# Patient Record
Sex: FEMALE | Race: WHITE | NOT HISPANIC OR LATINO
[De-identification: names, ages, dates, MRNs, and addresses within clinical notes are randomized per-mention and may not be internally consistent; named-entity substitution may affect disease eponyms.]

---

## 2020-07-02 PROBLEM — Z00.00 ENCOUNTER FOR PREVENTIVE HEALTH EXAMINATION: Status: ACTIVE | Noted: 2020-07-02

## 2020-07-05 DIAGNOSIS — M79.672 PAIN IN LEFT FOOT: ICD-10-CM

## 2020-07-06 ENCOUNTER — APPOINTMENT (OUTPATIENT)
Dept: ORTHOPEDIC SURGERY | Facility: CLINIC | Age: 80
End: 2020-07-06
Payer: MEDICARE

## 2020-07-06 VITALS — WEIGHT: 200 LBS | RESPIRATION RATE: 16 BRPM | BODY MASS INDEX: 32.14 KG/M2 | HEIGHT: 66 IN

## 2020-07-06 DIAGNOSIS — Z78.9 OTHER SPECIFIED HEALTH STATUS: ICD-10-CM

## 2020-07-06 PROCEDURE — 73630 X-RAY EXAM OF FOOT: CPT | Mod: RT

## 2020-07-06 PROCEDURE — 73610 X-RAY EXAM OF ANKLE: CPT | Mod: RT

## 2020-07-06 PROCEDURE — 99204 OFFICE O/P NEW MOD 45 MIN: CPT

## 2020-07-06 RX ORDER — CHLORHEXIDINE GLUCONATE, 0.12% ORAL RINSE 1.2 MG/ML
0.12 SOLUTION DENTAL
Qty: 473 | Refills: 0 | Status: ACTIVE | COMMUNITY
Start: 2020-01-30

## 2020-07-06 RX ORDER — VALACYCLOVIR 500 MG/1
500 TABLET, FILM COATED ORAL
Qty: 270 | Refills: 0 | Status: ACTIVE | COMMUNITY
Start: 2019-10-14

## 2020-07-06 RX ORDER — METFORMIN ER 500 MG 500 MG/1
500 TABLET ORAL
Qty: 180 | Refills: 0 | Status: ACTIVE | COMMUNITY
Start: 2019-10-21

## 2020-07-06 RX ORDER — TOLTERODINE TARTRATE 4 MG/1
4 CAPSULE, EXTENDED RELEASE ORAL
Qty: 90 | Refills: 0 | Status: ACTIVE | COMMUNITY
Start: 2019-10-14

## 2020-07-15 ENCOUNTER — APPOINTMENT (OUTPATIENT)
Dept: ORTHOPEDIC SURGERY | Facility: CLINIC | Age: 80
End: 2020-07-15
Payer: MEDICARE

## 2020-07-15 PROCEDURE — 99442: CPT | Mod: 95

## 2020-08-14 ENCOUNTER — APPOINTMENT (OUTPATIENT)
Dept: ORTHOPEDIC SURGERY | Facility: CLINIC | Age: 80
End: 2020-08-14
Payer: MEDICARE

## 2020-08-14 VITALS — BODY MASS INDEX: 32.14 KG/M2 | HEIGHT: 66 IN | WEIGHT: 200 LBS | RESPIRATION RATE: 16 BRPM

## 2020-08-14 DIAGNOSIS — M79.671 PAIN IN RIGHT FOOT: ICD-10-CM

## 2020-08-14 PROCEDURE — 73630 X-RAY EXAM OF FOOT: CPT | Mod: RT

## 2020-08-14 PROCEDURE — 99213 OFFICE O/P EST LOW 20 MIN: CPT

## 2020-10-05 ENCOUNTER — APPOINTMENT (OUTPATIENT)
Dept: ORTHOPEDIC SURGERY | Facility: CLINIC | Age: 80
End: 2020-10-05
Payer: MEDICARE

## 2020-10-05 VITALS — BODY MASS INDEX: 32.14 KG/M2 | WEIGHT: 200 LBS | RESPIRATION RATE: 16 BRPM | HEIGHT: 66 IN

## 2020-10-05 DIAGNOSIS — M84.374A STRESS FRACTURE, RIGHT FOOT, INITIAL ENCOUNTER FOR FRACTURE: ICD-10-CM

## 2020-10-05 DIAGNOSIS — M21.6X1 OTHER ACQUIRED DEFORMITIES OF RIGHT FOOT: ICD-10-CM

## 2020-10-05 DIAGNOSIS — M19.071 PRIMARY OSTEOARTHRITIS, RIGHT ANKLE AND FOOT: ICD-10-CM

## 2020-10-05 PROCEDURE — 99214 OFFICE O/P EST MOD 30 MIN: CPT

## 2020-10-05 PROCEDURE — 73630 X-RAY EXAM OF FOOT: CPT | Mod: RT

## 2020-10-05 RX ORDER — DICLOFENAC SODIUM 10 MG/G
1 GEL TOPICAL DAILY
Qty: 1 | Refills: 3 | Status: ACTIVE | COMMUNITY
Start: 2020-10-05 | End: 1900-01-01

## 2020-10-19 ENCOUNTER — TRANSCRIPTION ENCOUNTER (OUTPATIENT)
Age: 80
End: 2020-10-19

## 2022-07-13 ENCOUNTER — RX ONLY (RX ONLY)
Age: 82
End: 2022-07-13

## 2022-07-13 ENCOUNTER — OFFICE (OUTPATIENT)
Dept: URBAN - METROPOLITAN AREA CLINIC 30 | Facility: CLINIC | Age: 82
Setting detail: OPHTHALMOLOGY
End: 2022-07-13
Payer: MEDICARE

## 2022-07-13 DIAGNOSIS — H35.3121: ICD-10-CM

## 2022-07-13 DIAGNOSIS — H43.813: ICD-10-CM

## 2022-07-13 DIAGNOSIS — H26.493: ICD-10-CM

## 2022-07-13 DIAGNOSIS — H40.003: ICD-10-CM

## 2022-07-13 DIAGNOSIS — H35.3211: ICD-10-CM

## 2022-07-13 PROCEDURE — 92133 CPTRZD OPH DX IMG PST SGM ON: CPT | Performed by: OPHTHALMOLOGY

## 2022-07-13 PROCEDURE — 99204 OFFICE O/P NEW MOD 45 MIN: CPT | Performed by: OPHTHALMOLOGY

## 2022-07-13 PROCEDURE — 76514 ECHO EXAM OF EYE THICKNESS: CPT | Performed by: OPHTHALMOLOGY

## 2022-07-13 PROCEDURE — 92020 GONIOSCOPY: CPT | Performed by: OPHTHALMOLOGY

## 2022-07-13 ASSESSMENT — PACHYMETRY
OS_CT_UM: 547
OS_CT_CORRECTION: 0
OD_CT_CORRECTION: -1
OD_CT_UM: 566

## 2022-07-13 ASSESSMENT — CONFRONTATIONAL VISUAL FIELD TEST (CVF)
OS_FINDINGS: FULL
OD_FINDINGS: FULL

## 2022-07-13 ASSESSMENT — TONOMETRY
OS_IOP_MMHG: 19
OD_IOP_MMHG: 18

## 2022-07-15 ASSESSMENT — VISUAL ACUITY
OD_BCVA: 20/30+2
OS_BCVA: 20/25-2

## 2022-07-15 ASSESSMENT — REFRACTION_AUTOREFRACTION
OS_SPHERE: -0.75
OS_AXIS: 15
OD_SPHERE: -0.50
OS_CYLINDER: +0.25
OD_CYLINDER: +0.75
OD_AXIS: 120

## 2022-07-15 ASSESSMENT — REFRACTION_CURRENTRX
OD_OVR_VA: 20/
OS_SPHERE: +1.75
OD_SPHERE: +1.75
OS_OVR_VA: 20/

## 2022-07-15 ASSESSMENT — SPHEQUIV_DERIVED
OS_SPHEQUIV: -0.625
OD_SPHEQUIV: -0.125

## 2022-08-11 ENCOUNTER — OFFICE (OUTPATIENT)
Dept: URBAN - METROPOLITAN AREA CLINIC 29 | Facility: CLINIC | Age: 82
Setting detail: OPHTHALMOLOGY
End: 2022-08-11
Payer: MEDICARE

## 2022-08-11 DIAGNOSIS — H40.002: ICD-10-CM

## 2022-08-11 DIAGNOSIS — H40.003: ICD-10-CM

## 2022-08-11 DIAGNOSIS — H26.493: ICD-10-CM

## 2022-08-11 DIAGNOSIS — H35.3121: ICD-10-CM

## 2022-08-11 DIAGNOSIS — H35.3211: ICD-10-CM

## 2022-08-11 PROCEDURE — 99213 OFFICE O/P EST LOW 20 MIN: CPT | Performed by: OPHTHALMOLOGY

## 2022-08-11 PROCEDURE — 65855 TRABECULOPLASTY LASER SURG: CPT | Performed by: OPHTHALMOLOGY

## 2022-08-11 ASSESSMENT — REFRACTION_AUTOREFRACTION
OS_SPHERE: -0.75
OD_AXIS: 120
OS_AXIS: 15
OS_CYLINDER: +0.25
OD_CYLINDER: +0.75
OD_SPHERE: -0.50

## 2022-08-11 ASSESSMENT — PACHYMETRY
OD_CT_UM: 566
OD_CT_CORRECTION: -1

## 2022-08-11 ASSESSMENT — REFRACTION_CURRENTRX
OD_OVR_VA: 20/
OS_OVR_VA: 20/
OS_SPHERE: +1.75
OD_SPHERE: +1.75

## 2022-08-11 ASSESSMENT — SPHEQUIV_DERIVED
OS_SPHEQUIV: -0.625
OD_SPHEQUIV: -0.125

## 2022-08-11 ASSESSMENT — TONOMETRY
OS_IOP_MMHG: 20
OD_IOP_MMHG: 17

## 2022-08-11 ASSESSMENT — CONFRONTATIONAL VISUAL FIELD TEST (CVF)
OS_FINDINGS: FULL
OD_FINDINGS: FULL

## 2022-08-11 ASSESSMENT — VISUAL ACUITY
OD_BCVA: 20/50
OS_BCVA: 20/20

## 2022-08-27 DIAGNOSIS — M25.561 PAIN IN RIGHT KNEE: ICD-10-CM

## 2022-08-29 ENCOUNTER — APPOINTMENT (OUTPATIENT)
Dept: ORTHOPEDIC SURGERY | Facility: CLINIC | Age: 82
End: 2022-08-29

## 2022-08-29 VITALS — BODY MASS INDEX: 35.36 KG/M2 | HEIGHT: 66 IN | WEIGHT: 220 LBS

## 2022-08-29 PROCEDURE — 20610 DRAIN/INJ JOINT/BURSA W/O US: CPT | Mod: RT

## 2022-08-29 PROCEDURE — 99215 OFFICE O/P EST HI 40 MIN: CPT | Mod: 25

## 2022-08-29 PROCEDURE — 73564 X-RAY EXAM KNEE 4 OR MORE: CPT | Mod: RT

## 2022-08-29 NOTE — DISCUSSION/SUMMARY
[de-identified] : Discussed at length the nature of the patient's condition. Their right knee symptoms appear secondary to degenerative arthritis. We reviewed operative and non-operative treatment. While I believe she would eventually benefit from a TKR, she is hesitant to have surgery at this time. We will seek authorization for right knee Euflexxa injections. Once we receive authorization, we will proceed accordingly. In the interim, patient was given a right knee cortisone injection today as detailed above for symptomatic relief. I also suggested PT, weight loss, and Tylenol, Advil or Aleve prn. They can continue activities as tolerated. All was explained in the presence of their .

## 2022-08-29 NOTE — PHYSICAL EXAM
[de-identified] : General appearance: well nourished and hydrated, pleasant, alert and oriented x 3, cooperative.\par HEENT: Normocephalic, EOM intact, Nasal septum midline, Oral cavity clear, External auditory canal clear.\par Cardiovascular: no apparent abnormalities, no lower leg edema, no varicosities, pedal pulses are palpable.\par Lymphatics Lymph nodes: none palpated, Lymphedema: not present.\par Neurologic: sensation is normal, no muscle weakness in upper or lower extremities, patella tendon reflexes intact .\par Dermatologic no apparent skin lesions, moist, warm, no rash.\par Spine:cervical spine appears normal and moves freely, thoracic spine appears normal and moves freely, lumbosacral spine appears normal and moves freely.\par Gait: nonantalgic.\par \par Left knee\par Inspection: no effusion or erythema.\par Wounds: none.\par Alignment: normal.\par Palpation: no specific tenderness on palpation.\par ROM active (in degrees): 0-110 with mild crepitus.\par Ligamentous laxity: all ligaments appear stable,, negative ant. drawer test, negative post. drawer test, stable to varus stress test, stable to valgus stress test. negative Lachman's test, negative pivot shift test\par Meniscal Test: negative McMurrays, negative Staci.\par Patellofemoral Alignment Test: Q angle-, normal.\par Muscle Test: good quad strength.\par \par Right knee\par Inspection: no effusion or erythema.\par Wounds: none.\par Alignment: normal.\par Palpation: medial and lateral tenderness on palpation.\par ROM active (in degrees): 0-120 with pain on extremes of flexion & extension.\par Ligamentous laxity: all ligaments appear stable,, negative ant. drawer test, negative post. drawer test, stable to varus stress test, stable to valgus stress test. negative Lachman's test, negative pivot shift test\par Meniscal Test: negative McMurrays, negative Staci.\par Patellofemoral Alignment Test: Q angle-, normal.\par Muscle Test: good quad strength.\par \par Left hip\par Inspection: No swelling or ecchymosis.\par Wounds: none.\par Palpation: non-tender.\par Stability: no instability.\par Strength: 5/5 all motor groups.\par ROM: no pain with FROM.\par Leg length: equal.\par \par Right hip\par Inspection: No swelling or ecchymosis.\par Wounds: none.\par Palpation: non-tender.\par Stability: no instability.\par Strength: 5/5 all motor groups.\par ROM: no pain with FROM.\par Leg length: equal.\par \par Left ankle\par Inspection: no erythema noted, no swelling noted.\par Palpation: no pain on palpation .\par ROM: FROM without crepitus.\par Muscle strength: 5/5.\par Stability: no instability noted.\par \par Right ankle\par Inspection: no erythema noted, no swelling noted.\par ROM: FROM without crepitus.\par Palpation: no pain on palpation .\par Muscle strength: 5/5.\par Stability: no instability noted.\par \par Left foot\par Inspection: color, texture and turgor are normal.\par ROM: full range of motion of all joints without pain or crepitus.\par Palpation: no tenderness.\par Stability: no instability noted.\par \par Right foot\par Inspection: color, texture and turgor are normal.\par ROM: full range of motion of all joints without pain or crepitus.\par Palpation: no tenderness.\par Stability: no instability noted.\par \par Left shoulder\par Inspection: no muscle asymmetry, no atrophy.\par Palpation: no tenderness noted, ACJ non-tender.\par ROM: full active ROM, full passive ROM.\par Strength testing): anterior deltoid, supraspinatus, infraspinatus, subscapularis all 5/5.\par Stability test: ant. apprehension negative, post. apprehension negative, relocation test negative.\par Impingement Test: negative NEER.\par \par Right shoulder\par Inspection: no muscle asymmetry, no atrophy.\par Palpation: no tenderness noted, ACJ non-tender.\par ROM: full active ROM, full passive ROM.\par Strength testing): anterior deltoid, supraspinatus, infraspinatus, subscapularis all 5/5.\par Stability test: ant. apprehension negative, post. apprehension negative, relocation test negative.\par Impingement Test: negative NEER.\par Surgical Wounds: none.\par \par Left elbow\par Inspection: negative swelling.\par Wounds: none.\par Palpation: non-tender.\par ROM: full ROM.\par Strength: 5/5 all groups.\par Stability: no instability.\par Mass: none.\par \par Right elbow\par Inspection: negative swelling.\par Wounds: none.\par Palpation: non-tender.\par ROM: full ROM.\par Strength: 5/5 all groups.\par Stability: no instability.\par Mass: none.\par \par Left wrist\par Inspection: negative swelling.\par Wound: none.\par Palpation (bone): no tenderness.\par ROM: full ROM.\par Strength: full , good.\par \par Right wrist\par Inspection: negative swelling.\par Wound: none.\par Palpation (bone): no tenderness.\par ROM: full ROM.\par Strength: full , good.\par \par Left hand \par Inspection: no skin changes, normal appearance.\par Wounds: none. \par Strength: full , able to make full fist.\par Sensation: light touch intact all fingers and thumb.\par Vascular: good capillary refill < 3 seconds, all fingers and thumb.\par Mass: none. \par \par Right hand\par Inspection: no skin changes, normal appearance. \par Wounds: none.\par Strength: full , able to make full fist. \par Sensation: light touch intact all fingers and thumb. \par Vascular: good capillary refill < 3 seconds, all fingers and thumb.\par Mass: none.  [de-identified] : RIGHT knee x-rays, standing AP/Lateral and Merchant films, and 45 degree PA standing view, taken at the office today shows diffuse tricompartmental degenerative arthritis, lateral joint space narrowing, bone on bone, Sclerosis, especially on the Tilley view, medial and lateral osteophyte formation, patella at an appropriate height and central position, Kellgren and Hugh grade 3-4. \par \par LEFT knee x-ray merchant view taken at the office today demonstrates joint space narrowing, marginal osteophytes, sclerosis consistent with patellofemoral arthritis

## 2022-08-29 NOTE — HISTORY OF PRESENT ILLNESS
[de-identified] : 82 year old female presents for initial evaluation of right knee pain for about a year. She saw Dr. Tim Chauhan in [place], who gave her hyalgan injections 4 years ago, which did help. She had another series of gel shots 2 years ago with Dr. Patel, which also helped. She was doing well until recently, when she fell twice. First, 2 months ago in [place], she saw Dr. Chauhan who gave her a walker. Her second fall was 3 weeks ago, she went to urgent care where her x-rays where found to be negative and she was advised to rest. Her pain is diffuse, sharp with swelling, clicking, and stiffness. She has difficulty getting up from a seated position and has pain after sitting for prolonged periods of time. When walking, she has pain at first, but it gets better as she walks for a while. Patient takes the stairs one at a time using the handrail. It is easier to go up stairs than down. Her  got her a cane but she didn't really use it. She takes 2 Tylenol and 2 Advil at once, which helps. She has allergies to penicillin and opiates. No significant medical history. Her  is a GI doctor.

## 2022-08-29 NOTE — PROCEDURE
[de-identified] : RIGHT KNEE CORTISONE INJECTION\par Discussed at length with the patient the planned steroid and lidocaine injection. The risks, benefits, convalescence and alternatives were reviewed. The possible side effects discussed included but were not limited to: pain, swelling, heat and redness. These symptoms are generally mild but if they are extensive then contact the office. Giving pain relievers by mouth such as NSAID’s or Tylenol can generally treat the reactions to steroid and lidocaine. Rare cases of infection have been noted. Rash, hives and itching may occur post injection. If you have muscle pain or cramps, flushing and or swelling of the face, rapid heart beat, nausea, dizziness, fever, chills, headache, difficulty breathing, swelling in the arms or legs, or have a prickly feeling of your skin, contact a health care provider immediately.\par \par Following this discussion, the knee was prepped with betadine and under sterile conditions 5 cc of 2% lidocaine and 1 cc depo-medrol (40mg) were injected with a 21 gauge needle. The needle was introduced into the joint, aspiration was performed to ensure intra-articular placement and the medication was injected. Upon withdrawal of the needle the site was cleaned with alcohol and a bandaid applied. The patient tolerated the injection well and there were no adverse effects. Post injection instructions included no strenuous activity for 24 hours, cryotherapy and if there are any adverse effects to contact the office.

## 2022-09-09 VITALS — WEIGHT: 220 LBS | BODY MASS INDEX: 35.36 KG/M2 | HEIGHT: 66 IN

## 2022-09-12 ENCOUNTER — APPOINTMENT (OUTPATIENT)
Dept: ORTHOPEDIC SURGERY | Facility: CLINIC | Age: 82
End: 2022-09-12

## 2022-09-12 PROCEDURE — 20610 DRAIN/INJ JOINT/BURSA W/O US: CPT | Mod: RT

## 2022-09-12 NOTE — PROCEDURE
[de-identified] : Euflexxa # 1 Right Knee\par Discussed at length with the patient the planned Euflexxa injection. The risks, benefits, convalescence and alternatives were reviewed. The possible side effects discussed included but were not limited to: pain, swelling, heat and redness. There symptoms are generally mild but if they are extensive then contact the office. Giving pain relievers by mouth such as NSAID’s or Tylenol can generally treat the reactions to Euflexxa. Rare cases of infection have been noted. Rash, hives and itching may occur post injection. If you have muscle pain or cramps, flushing and or swelling of the face, rapid heart beat, nausea, dizziness, fever, chills, headache, difficulty breathing, swelling in the arms or legs, or have a prickly feeling of your skin, contact a health care provider immediately.\par \par Following this discussion, the knee was prepped with betadine and under sterile condition the Euflexxa injection was performed with a 22 gauge needle. The needle was introduced into the joint, aspiration was performed to ensure intra-articular placement and the medication was injected. Upon withdrawal of the needle the site was cleaned with alcohol and a bandaid applied. The patient tolerated the injection well and there were no adverse effects. Post injection instructions included no strenuous activity for 24 hours, cryotherapy and if there are any adverse effects to contact the office.\par

## 2022-09-18 VITALS — WEIGHT: 220 LBS | BODY MASS INDEX: 35.36 KG/M2 | HEIGHT: 66 IN

## 2022-09-19 ENCOUNTER — APPOINTMENT (OUTPATIENT)
Dept: ORTHOPEDIC SURGERY | Facility: CLINIC | Age: 82
End: 2022-09-19

## 2022-09-19 PROCEDURE — 20610 DRAIN/INJ JOINT/BURSA W/O US: CPT | Mod: RT

## 2022-09-19 NOTE — PROCEDURE
[de-identified] : Euflexxa # 2 Right knee\par Discussed at length with the patient the planned Euflexxa injection. The risks, benefits, convalescence and alternatives were reviewed. The possible side effects discussed included but were not limited to: pain, swelling, heat and redness. There symptoms are generally mild but if they are extensive then contact the office. Giving pain relievers by mouth such as NSAID’s or Tylenol can generally treat the reactions to Euflexxa. Rare cases of infection have been noted. Rash, hives and itching may occur post injection. If you have muscle pain or cramps, flushing and or swelling of the face, rapid heart beat, nausea, dizziness, fever, chills, headache, difficulty breathing, swelling in the arms or legs, or have a prickly feeling of your skin, contact a health care provider immediately.\par \par Following this discussion, the knee was prepped with betadine and under sterile condition the Euflexxa injection was performed with a 22 gauge needle. The needle was introduced into the joint, aspiration was performed to ensure intra-articular placement and the medication was injected. Upon withdrawal of the needle the site was cleaned with alcohol and a bandaid applied. The patient tolerated the injection well and there were no adverse effects. Post injection instructions included no strenuous activity for 24 hours, cryotherapy and if there are any adverse effects to contact the office.\par

## 2022-09-23 ENCOUNTER — OFFICE (OUTPATIENT)
Dept: URBAN - METROPOLITAN AREA CLINIC 29 | Facility: CLINIC | Age: 82
Setting detail: OPHTHALMOLOGY
End: 2022-09-23
Payer: MEDICARE

## 2022-09-23 DIAGNOSIS — H35.3121: ICD-10-CM

## 2022-09-23 DIAGNOSIS — H26.493: ICD-10-CM

## 2022-09-23 DIAGNOSIS — H40.001: ICD-10-CM

## 2022-09-23 DIAGNOSIS — H35.3211: ICD-10-CM

## 2022-09-23 DIAGNOSIS — H43.813: ICD-10-CM

## 2022-09-23 DIAGNOSIS — H40.003: ICD-10-CM

## 2022-09-23 DIAGNOSIS — H40.002: ICD-10-CM

## 2022-09-23 PROCEDURE — 99213 OFFICE O/P EST LOW 20 MIN: CPT | Performed by: OPHTHALMOLOGY

## 2022-09-23 ASSESSMENT — TONOMETRY
OD_IOP_MMHG: 17
OS_IOP_MMHG: 17

## 2022-09-23 ASSESSMENT — REFRACTION_AUTOREFRACTION
OD_AXIS: 120
OS_CYLINDER: +0.25
OD_CYLINDER: +0.75
OS_SPHERE: -0.75
OS_AXIS: 15
OD_SPHERE: -0.50

## 2022-09-23 ASSESSMENT — CONFRONTATIONAL VISUAL FIELD TEST (CVF)
OS_FINDINGS: FULL
OD_FINDINGS: FULL

## 2022-09-23 ASSESSMENT — PACHYMETRY
OD_CT_UM: 566
OD_CT_CORRECTION: -1

## 2022-09-23 ASSESSMENT — VISUAL ACUITY
OD_BCVA: 20/30-1
OS_BCVA: 20/20-2

## 2022-09-23 ASSESSMENT — SPHEQUIV_DERIVED
OS_SPHEQUIV: -0.625
OD_SPHEQUIV: -0.125

## 2022-09-23 ASSESSMENT — REFRACTION_CURRENTRX
OD_SPHERE: +1.75
OD_OVR_VA: 20/
OS_OVR_VA: 20/
OS_SPHERE: +1.75

## 2022-09-26 ENCOUNTER — APPOINTMENT (OUTPATIENT)
Dept: ORTHOPEDIC SURGERY | Facility: CLINIC | Age: 82
End: 2022-09-26

## 2022-10-02 VITALS — WEIGHT: 220 LBS | BODY MASS INDEX: 35.36 KG/M2 | HEIGHT: 66 IN

## 2022-10-03 ENCOUNTER — APPOINTMENT (OUTPATIENT)
Dept: ORTHOPEDIC SURGERY | Facility: CLINIC | Age: 82
End: 2022-10-03

## 2022-10-03 PROCEDURE — 20610 DRAIN/INJ JOINT/BURSA W/O US: CPT | Mod: RT

## 2022-10-03 NOTE — PROCEDURE
[de-identified] : Euflexxa # 3 Right knee\par Discussed at length with the patient the planned Euflexxa injection. The risks, benefits, convalescence and alternatives were reviewed. The possible side effects discussed included but were not limited to: pain, swelling, heat and redness. There symptoms are generally mild but if they are extensive then contact the office. Giving pain relievers by mouth such as NSAID’s or Tylenol can generally treat the reactions to Euflexxa. Rare cases of infection have been noted. Rash, hives and itching may occur post injection. If you have muscle pain or cramps, flushing and or swelling of the face, rapid heart beat, nausea, dizziness, fever, chills, headache, difficulty breathing, swelling in the arms or legs, or have a prickly feeling of your skin, contact a health care provider immediately.\par \par Following this discussion, the knee was prepped with betadine and under sterile condition the Euflexxa injection was performed with a 22 gauge needle. The needle was introduced into the joint, aspiration was performed to ensure intra-articular placement and the medication was injected. Upon withdrawal of the needle the site was cleaned with alcohol and a bandaid applied. The patient tolerated the injection well and there were no adverse effects. Post injection instructions included no strenuous activity for 24 hours, cryotherapy and if there are any adverse effects to contact the office.\par

## 2023-06-16 ENCOUNTER — OFFICE (OUTPATIENT)
Dept: URBAN - METROPOLITAN AREA CLINIC 29 | Facility: CLINIC | Age: 83
Setting detail: OPHTHALMOLOGY
End: 2023-06-16
Payer: MEDICARE

## 2023-06-16 ENCOUNTER — RX ONLY (RX ONLY)
Age: 83
End: 2023-06-16

## 2023-06-16 DIAGNOSIS — H43.813: ICD-10-CM

## 2023-06-16 DIAGNOSIS — H40.003: ICD-10-CM

## 2023-06-16 DIAGNOSIS — H35.3121: ICD-10-CM

## 2023-06-16 DIAGNOSIS — H40.001: ICD-10-CM

## 2023-06-16 DIAGNOSIS — H40.002: ICD-10-CM

## 2023-06-16 DIAGNOSIS — H35.3211: ICD-10-CM

## 2023-06-16 DIAGNOSIS — H26.493: ICD-10-CM

## 2023-06-16 PROCEDURE — 92083 EXTENDED VISUAL FIELD XM: CPT | Performed by: OPHTHALMOLOGY

## 2023-06-16 PROCEDURE — 99214 OFFICE O/P EST MOD 30 MIN: CPT | Performed by: OPHTHALMOLOGY

## 2023-06-16 PROCEDURE — 92133 CPTRZD OPH DX IMG PST SGM ON: CPT | Performed by: OPHTHALMOLOGY

## 2023-06-16 ASSESSMENT — REFRACTION_AUTOREFRACTION
OD_AXIS: 140
OD_SPHERE: -0.50
OS_AXIS: 035
OS_SPHERE: -1.00
OS_CYLINDER: +0.25
OD_CYLINDER: +0.25

## 2023-06-16 ASSESSMENT — VISUAL ACUITY
OS_BCVA: 20/30+1
OD_BCVA: 20/30-2

## 2023-06-16 ASSESSMENT — CONFRONTATIONAL VISUAL FIELD TEST (CVF)
OS_FINDINGS: FULL
OD_FINDINGS: FULL

## 2023-06-16 ASSESSMENT — SPHEQUIV_DERIVED
OD_SPHEQUIV: -0.375
OS_SPHEQUIV: -0.875

## 2023-06-16 ASSESSMENT — TONOMETRY
OS_IOP_MMHG: 14
OD_IOP_MMHG: 16

## 2023-06-16 ASSESSMENT — PACHYMETRY
OD_CT_UM: 566
OD_CT_CORRECTION: -1

## 2023-06-16 ASSESSMENT — REFRACTION_CURRENTRX
OD_SPHERE: +1.75
OS_SPHERE: +1.75
OS_OVR_VA: 20/
OD_OVR_VA: 20/

## 2023-08-14 ENCOUNTER — APPOINTMENT (OUTPATIENT)
Dept: ORTHOPEDIC SURGERY | Facility: CLINIC | Age: 83
End: 2023-08-14
Payer: MEDICARE

## 2023-08-14 ENCOUNTER — OUTPATIENT (OUTPATIENT)
Dept: OUTPATIENT SERVICES | Facility: HOSPITAL | Age: 83
LOS: 1 days | Discharge: HOME | End: 2023-08-14
Payer: MEDICARE

## 2023-08-14 VITALS — HEART RATE: 85 BPM | WEIGHT: 212.75 LBS | TEMPERATURE: 98 F | HEIGHT: 66 IN

## 2023-08-14 VITALS — HEIGHT: 66 IN | WEIGHT: 208 LBS | BODY MASS INDEX: 33.43 KG/M2

## 2023-08-14 DIAGNOSIS — M17.11 UNILATERAL PRIMARY OSTEOARTHRITIS, RIGHT KNEE: ICD-10-CM

## 2023-08-14 DIAGNOSIS — Z01.818 ENCOUNTER FOR OTHER PREPROCEDURAL EXAMINATION: ICD-10-CM

## 2023-08-14 DIAGNOSIS — Z98.891 HISTORY OF UTERINE SCAR FROM PREVIOUS SURGERY: Chronic | ICD-10-CM

## 2023-08-14 DIAGNOSIS — Z90.49 ACQUIRED ABSENCE OF OTHER SPECIFIED PARTS OF DIGESTIVE TRACT: Chronic | ICD-10-CM

## 2023-08-14 DIAGNOSIS — Z98.890 OTHER SPECIFIED POSTPROCEDURAL STATES: Chronic | ICD-10-CM

## 2023-08-14 LAB
ALBUMIN SERPL ELPH-MCNC: 3.8 G/DL — SIGNIFICANT CHANGE UP (ref 3.3–5)
ALP SERPL-CCNC: 65 U/L — SIGNIFICANT CHANGE UP (ref 40–120)
ALT FLD-CCNC: 17 U/L — SIGNIFICANT CHANGE UP (ref 12–78)
ANION GAP SERPL CALC-SCNC: 1 MMOL/L — LOW (ref 5–17)
APTT BLD: 34.9 SEC — SIGNIFICANT CHANGE UP (ref 24.5–35.6)
AST SERPL-CCNC: 16 U/L — SIGNIFICANT CHANGE UP (ref 15–37)
BASOPHILS # BLD AUTO: 0.03 K/UL — SIGNIFICANT CHANGE UP (ref 0–0.2)
BASOPHILS NFR BLD AUTO: 0.3 % — SIGNIFICANT CHANGE UP (ref 0–2)
BILIRUB SERPL-MCNC: 0.5 MG/DL — SIGNIFICANT CHANGE UP (ref 0.2–1.2)
BUN SERPL-MCNC: 18 MG/DL — SIGNIFICANT CHANGE UP (ref 7–23)
CALCIUM SERPL-MCNC: 9.5 MG/DL — SIGNIFICANT CHANGE UP (ref 8.5–10.1)
CHLORIDE SERPL-SCNC: 110 MMOL/L — HIGH (ref 96–108)
CO2 SERPL-SCNC: 32 MMOL/L — HIGH (ref 22–31)
CREAT SERPL-MCNC: 1.12 MG/DL — SIGNIFICANT CHANGE UP (ref 0.5–1.3)
EGFR: 48 ML/MIN/1.73M2 — LOW
EOSINOPHIL # BLD AUTO: 0.28 K/UL — SIGNIFICANT CHANGE UP (ref 0–0.5)
EOSINOPHIL NFR BLD AUTO: 3.1 % — SIGNIFICANT CHANGE UP (ref 0–6)
GLUCOSE SERPL-MCNC: 102 MG/DL — HIGH (ref 70–99)
HCT VFR BLD CALC: 39.4 % — SIGNIFICANT CHANGE UP (ref 34.5–45)
HGB BLD-MCNC: 13.4 G/DL — SIGNIFICANT CHANGE UP (ref 11.5–15.5)
IMM GRANULOCYTES NFR BLD AUTO: 0.2 % — SIGNIFICANT CHANGE UP (ref 0–0.9)
INR BLD: 0.91 RATIO — SIGNIFICANT CHANGE UP (ref 0.85–1.18)
LYMPHOCYTES # BLD AUTO: 2.7 K/UL — SIGNIFICANT CHANGE UP (ref 1–3.3)
LYMPHOCYTES # BLD AUTO: 30.2 % — SIGNIFICANT CHANGE UP (ref 13–44)
MCHC RBC-ENTMCNC: 30.7 PG — SIGNIFICANT CHANGE UP (ref 27–34)
MCHC RBC-ENTMCNC: 34 G/DL — SIGNIFICANT CHANGE UP (ref 32–36)
MCV RBC AUTO: 90.2 FL — SIGNIFICANT CHANGE UP (ref 80–100)
MONOCYTES # BLD AUTO: 1.2 K/UL — HIGH (ref 0–0.9)
MONOCYTES NFR BLD AUTO: 13.4 % — SIGNIFICANT CHANGE UP (ref 2–14)
MRSA PCR RESULT.: SIGNIFICANT CHANGE UP
NEUTROPHILS # BLD AUTO: 4.7 K/UL — SIGNIFICANT CHANGE UP (ref 1.8–7.4)
NEUTROPHILS NFR BLD AUTO: 52.8 % — SIGNIFICANT CHANGE UP (ref 43–77)
NRBC # BLD: 0 /100 WBCS — SIGNIFICANT CHANGE UP (ref 0–0)
PLATELET # BLD AUTO: 250 K/UL — SIGNIFICANT CHANGE UP (ref 150–400)
POTASSIUM SERPL-MCNC: 3.8 MMOL/L — SIGNIFICANT CHANGE UP (ref 3.5–5.3)
POTASSIUM SERPL-SCNC: 3.8 MMOL/L — SIGNIFICANT CHANGE UP (ref 3.5–5.3)
PROT SERPL-MCNC: 7.2 GM/DL — SIGNIFICANT CHANGE UP (ref 6–8.3)
PROTHROM AB SERPL-ACNC: 10.9 SEC — SIGNIFICANT CHANGE UP (ref 9.5–13)
RBC # BLD: 4.37 M/UL — SIGNIFICANT CHANGE UP (ref 3.8–5.2)
RBC # FLD: 13.2 % — SIGNIFICANT CHANGE UP (ref 10.3–14.5)
S AUREUS DNA NOSE QL NAA+PROBE: SIGNIFICANT CHANGE UP
SODIUM SERPL-SCNC: 143 MMOL/L — SIGNIFICANT CHANGE UP (ref 135–145)
VIT D25+D1,25 OH+D1,25 PNL SERPL-MCNC: 42.2 PG/ML — SIGNIFICANT CHANGE UP (ref 19.9–79.3)
WBC # BLD: 8.93 K/UL — SIGNIFICANT CHANGE UP (ref 3.8–10.5)
WBC # FLD AUTO: 8.93 K/UL — SIGNIFICANT CHANGE UP (ref 3.8–10.5)

## 2023-08-14 PROCEDURE — 73564 X-RAY EXAM KNEE 4 OR MORE: CPT | Mod: RT

## 2023-08-14 PROCEDURE — 99214 OFFICE O/P EST MOD 30 MIN: CPT

## 2023-08-14 PROCEDURE — 93010 ELECTROCARDIOGRAM REPORT: CPT

## 2023-08-14 NOTE — ADDENDUM
[FreeTextEntry1] : This note was written by MILVIA MEJIAS on 08/14/2023 acting as scribe for Dr. Josue SUN I, Dr. Josue Mariscal, have read and attest that all the information, medical decision making and discharge instructions within are true and accurate.   This note was written by Ramiro Luna on 08/14/2023 acting as scribe for Dr. Josue SUN I, Dr. Josue Mariscal, have read and attest that all the information, medical decision making and discharge instructions within are true and accurate.

## 2023-08-14 NOTE — OCCUPATIONAL THERAPY INITIAL EVALUATION ADULT - LIVES WITH, PROFILE
in [facility] in a private house  with a threhold to enter without handrails, but wide enough to fit a rolling walker. All living amenities are located on one level. The bathroom has a tub/shower combination, 1 grab in the shower, fixed shower head and standard  toilet. Pt's toilet has adequate space to fit a commode over it./spouse in [facility] in a private house  with a threhold to enter without handrails, but wide enough to fit a rolling walker. All living amenities are located on one level. The bathroom has a tub/shower combination, 1 grab in the shower, fixed shower head and standard  toilet. Pt's toilet has adequate space to fit a commode over it./spouse

## 2023-08-14 NOTE — OCCUPATIONAL THERAPY INITIAL EVALUATION ADULT - NSOTDISCHREC_GEN_A_CORE
postoperatively to prevent falls, optimize pt's ability for ADL management & safely navigate in all terrains/Outpatient OT

## 2023-08-14 NOTE — H&P PST ADULT - PROBLEM SELECTOR PLAN 2
Assessment and Plan: labs - cbc,pt/ptt,bmp,t&s,nose cx,ekg  M/C required  preop 3 day hibiclens instruction reviewed and given .instructed on if  nose cx positive use mupuricin 5 days and checklist given  take routine meds DOS with sips of water. avoid NSAID and OTC supplements. verbalized understanding  information on proper nutrition , increase protein and better food choices provided in packet  anesthesiologist to review pst labs, ekg, medical clearances and optimization for surgery

## 2023-08-14 NOTE — H&P PST ADULT - SOURCE OF INFORMATION, PROFILE
6 UNITS INSULIN GIVEN FOR BLOOD SUGAR 267, PT STATED HE WILL EAT LUNCH WHEN HE ARRIVES HOME 
WHICH IS 15MIN AWAY. patient

## 2023-08-14 NOTE — H&P PST ADULT - ASSESSMENT
right knee osteoarhtis   CAPRINI SCORE    AGE RELATED RISK FACTORS                                                       MOBILITY RELATED FACTORS  [ ] Age 41-60 years                                            (1 Point)                  [ ] Bed rest                                                        (1 Point)  [ ] Age: 61-74 years                                           (2 Points)                [ ] Plaster cast                                                   (2 Points)  x[ ] Age= 75 years                                              (3 Points)                 [ ] Bed bound for more than 72 hours                   (2 Points)    DISEASE RELATED RISK FACTORS                                               GENDER SPECIFIC FACTORS  [ ] Edema in the lower extremities                       (1 Point)                  [ ] Pregnancy                                                     (1 Point)  [ ] Varicose veins                                               (1 Point)                  [ ] Post-partum < 6 weeks                                   (1 Point)             [x ] BMI > 25 Kg/m2                                            (1 Point)                  [ ] Hormonal therapy  or oral contraception            (1 Point)                 [ ] Sepsis (in the previous month)                        (1 Point)                  [ ] History of pregnancy complications  [ ] Pneumonia or serious lung disease                                               [ ] Unexplained or recurrent                       (1 Point)           (in the previous month)                               (1 Point)  [ ] Abnormal pulmonary function test                     (1 Point)                 SURGERY RELATED RISK FACTORS  [ ] Acute myocardial infarction                              (1 Point)                 [ ]  Section                                            (1 Point)  [ ] Congestive heart failure (in the previous month)  (1 Point)                 [ ] Minor surgery                                                 (1 Point)   [ ] Inflammatory bowel disease                             (1 Point)                 [ ] Arthroscopic surgery                                        (2 Points)  [ ] Central venous access                                    (2 Points)                [ ] General surgery lasting more than 45 minutes   (2 Points)       [ ] Stroke (in the previous month)                          (5 Points)               x[ ] Elective arthroplasty                                        (5 Points)                                                                                                                                               HEMATOLOGY RELATED FACTORS                                                 TRAUMA RELATED RISK FACTORS  [ ] Prior episodes of VTE                                     (3 Points)                 [ ] Fracture of the hip, pelvis, or leg                       (5 Points)  [ ] Positive family history for VTE                         (3 Points)                 [ ] Acute spinal cord injury (in the previous month)  (5 Points)  [ ] Prothrombin 62931 A                                      (3 Points)                 [ ] Paralysis  (less than 1 month)                          (5 Points)  [ ] Factor V Leiden                                             (3 Points)                 [ ] Multiple Trauma within 1 month                         (5 Points)  [ ] Lupus anticoagulants                                     (3 Points)                                                           [ ] Anticardiolipin antibodies                                (3 Points)                                                       [ ] High homocysteine in the blood                      (3 Points)                                             [ ] Other congenital or acquired thrombophilia       (3 Points)                                                [ ] Heparin induced thrombocytopenia                  (3 Points)                                          Total Score [      9    ]  Caprini Score 0-2: Low risk, No VTE Prophylaxis required for most patient's, encourage ambulation  Caprini Score 3-6: At Risk, Pharmacologic VTE prophylaxis is indicated for most patients ( in the absence of a contraindication)  Caprini Score Greater than or = 7: High Risk , pharmacologic VTE is indicated for most patients ( in the absence of a contraindication)    Caprini score indicates that the patient is high risk for VTE event ( score 6 or greater). Surgical patient's in this group will benefit from both pharmacologic prophylaxis and intermittent compression devices . Surgical team will determine the balance between VTE  risk and bleeding risk and other clinical considerations

## 2023-08-14 NOTE — OCCUPATIONAL THERAPY INITIAL EVALUATION ADULT - SHARP/DULL DISCRIMINATION, LUE, REHAB EVAL
Ongoing SW/CM Assessment/Plan of Care Note     See SW/CM flowsheets for goals and other objective data.    Patient/Family discharge goal (s):  Goal #1: Psychosocial needs assessed  Goal #2: Home discharge arranged  Goal #3: Home Care arranged or issues addressed    PT Recommendation:  Recommendation for Discharge: PT WI: Post acute therapy       OT Recommendation:  Recommendations for Discharge: OT WI: Post acute therapy(pending progress/degree of assist at home)       Disposition:  Planned Discharge Destination: Hospice    Progress note:   Discharge planning. Aware that SW peer initiated a referral for [facility] yesterday. Writer followed up with [facility] admissions (phone: [phone]) as they manage admissions for [facility]. Was told that their intake RN needs to review Pt's referral and would call writer back. State DNR paperwork was placed in Pt's chart for signatures. Messaged oncology NP about this. SW to follow.     1300: Call received from Marcelle with [facility] admissions (phone: [phone]) she indicated that she is reviewing Pt's referral and will likely call Pt's wife. She did share that, if Pt does not qualify for an acute level of care, room and board with be $350/day. Awaiting further updates from Marcelle at this time.          within normal limits

## 2023-08-14 NOTE — OCCUPATIONAL THERAPY INITIAL EVALUATION ADULT - SOCIAL CONCERNS
Pt voiced concerns about her recovery at home. Pt endorsed that her spouse will be able to assist her after she discharged home post-operatively./Complex psychosocial needs/coping issues

## 2023-08-14 NOTE — OCCUPATIONAL THERAPY INITIAL EVALUATION ADULT - ADDITIONAL COMMENTS
At this time ,Pt is functioning in her roles, self sufficient, driving & ambulating independently in the community without any assistive devices. Pt owns no DME Pt has an antalgic gait. Pt c/o 5/10 pain in her right knee . The pain is exacerbated, by walking, prolonged standing, negotiating steps and is relieved with Tylenol or Advil. Pt is right hand dominant and wears glasses for reading. At this time ,pt is functioning in her roles, self sufficient, driving & ambulating independently in the community without any assistive devices. Pt owns no DME.  Pt has an antalgic gait. Pt c/o 5/10 pain in her right knee. The pain is exacerbated, by walking, prolonged standing, negotiating steps and is relieved with Tylenol or Advil. Pt is right hand dominant and wears glasses for reading.

## 2023-08-14 NOTE — OCCUPATIONAL THERAPY INITIAL EVALUATION ADULT - STEREOGNOSIS, RUE, OT EVAL
Mingo Oconnell 2002  CONFIDENTIALTY NOTICE: This fax transmission is intended only for the addressee  It contains information that is legally privileged,  confidential or otherwise protected from use or disclosure  If you are not the intended recipient, you are strictly prohibited from reviewing,  disclosing, copying using or disseminating any of this information or taking any action in reliance on or regarding this information  If you have  received this fax in error, please notify us immediately by telephone so that we can arrange for its return to us  Page:   Call Id: [id number]  Health Call  Standard Call Report  Health Call  Patient Name: Mingo Oconnell  Gender: Female  : 2002  Age: 24 Y 9 M 12 D  Return Phone  Number: [phone] (Cell)  Address: [address] [place] [address]  City/State/Zip: [address] [place] [address]  Practice Name: [address] [place] [address]  Practice Charged:  Physician:  [facility] Name: Rivas Tripathi/ Mom  Relationship To  Patient: Self  Return Phone Number: [phone] (Cell)  Presenting Problem: "My daughter went to Urgent Care and  was given Ceftin after she told them  she is allergic to penicillin and she's  not sure what to do?"  Service Type: Triage  Charged Service 1: Messages  Pharmacy Name and  Number:  Nurse Assessment  Protocols  Protocol Title Nurse Date/Time  Disp  Time Disposition Final User  11/3/2019 9:33:57 PM Close Yes GLENDY Artis, Lyndsay Padilla  Comments  User: Neris Maldonado RN Date/Time: 11/3/2019 9:14:00 PM  Returned call for reaction to Ceftin  went to voice mail left a message  User: Neris Maldonado RN Date/Time: 11/3/2019 9:33:49 PM  Called Verenice Moerlos and she was seen today in the ED they told her to stop the Ceftin and put her on Azithromycin for 5 days  they  also did a mono test for swollen throat, and lymph nodes  Veernice Morelos doesn't have any other questions at this time  She will call back  if she has any questions  within normal limits

## 2023-08-14 NOTE — OCCUPATIONAL THERAPY INITIAL EVALUATION ADULT - GENERAL OBSERVATIONS, REHAB EVAL
Chart reviewed. Patient encountered seated in chair in rehab preop room in [facility]. Patient underwent occupational therapy pre-operative consultation to determine current functional ADL limitations in order to provide the right equipment for patient to perform functional ADL post operation.

## 2023-08-14 NOTE — OCCUPATIONAL THERAPY INITIAL EVALUATION ADULT - PERTINENT HX OF CURRENT PROBLEM, REHAB EVAL
Pt is an 84 y/o female slated for elective surgery for right TKR with Davey TINSLEY on 8/22/23 due to OA, chronic pain and DJD. Pt reported buckling in her right knee, but denied any falls in the past 3-6 months.

## 2023-08-14 NOTE — END OF VISIT
[FreeTextEntry3] : This note was written by Aster Whitney on 8/29/22 acting as scribe for Dr. Josue Mariscal M.D.\par  \par  I, Dr. Josue Mariscal, have read and attest that all the information, medical decision making and discharge instructions within are true and accurate. No

## 2023-08-14 NOTE — H&P PST ADULT - NSICDXPASTMEDICALHX_GEN_ALL_CORE_FT
PAST MEDICAL HISTORY:  GERD (gastroesophageal reflux disease)     H/O herpes evaluations     Pre-diabetes

## 2023-08-14 NOTE — DISCUSSION/SUMMARY
[de-identified] : Discussed at length the natural history of RIGHT knee degenerative arthritis with mild valgus deformity and reviewed non-operative and operative treatment. Due to the pain, failure of prior nonoperative treatment including injections, NSAIDs, and physiotherapy, and associated disability I recommend a RIGHT total knee replacement. The patient's expectations were reviewed and compared to my expectations. This allowed for a discussion regarding reasonable expectations for functional improvement, pain relief, and return to normal activities of daily life. Models were used as an educational tool. A thorough discussion was held with the patient regarding the purpose, limits, risks, and alternatives to total knee replacement. These can include but are not limited to: Anesthesia Risk, Bleeding, Transfusion, Infection Revision Surgery, Pneumonia, Coma, and Death. They expressed understanding and their questions were answered to their satisfaction. We did discuss correction of the valgus deformity and possible peroneal nerve palsy with numbness and foot and ankle weakness, which tends to be temporary, if develops may need ankle brace for walking.   Surgery will be scheduled at a convenient time.   Preop medical clearance.

## 2023-08-14 NOTE — HISTORY OF PRESENT ILLNESS
[de-identified] : DOE HANEY is a 83 year old female who presents for follow up evaluation of preoperative visit before right TKR surgery next week. She was undergoing a nonoperative treatment approach last year which was somewhat helpful. She is having continued discomfort and disability with activity, daily living, and recreational activity such as golf. She does not state any changes to her condition since her last visit.

## 2023-08-14 NOTE — PHYSICAL EXAM
[de-identified] : General appearance: well nourished and hydrated, pleasant, alert and oriented x 3, cooperative. HEENT: Normocephalic, EOM intact, Nasal septum midline, Oral cavity clear, External auditory canal clear. Cardiovascular: no apparent abnormalities, no lower leg edema, no varicosities, pedal pulses are palpable. Lymphatics Lymph nodes: none palpated, Lymphedema: not present. Neurologic: sensation is normal, no muscle weakness in upper or lower extremities, patella tendon reflexes intact . Dermatologic no apparent skin lesions, moist, warm, no rash. Spine:cervical spine appears normal and moves freely, thoracic spine appears normal and moves freely, lumbosacral spine appears normal and moves freely. Gait: nonantalgic.  Right knee Inspection: mild effusion, no erythema. Wounds: none. Alignment: 10 degrees valgus alignment . Palpation: no tenderness on palpation. ROM active (in degrees): 0-110 with discomfort and crepitus  Ligamentous laxity: all ligaments appear stable,, negative ant. drawer test, negative post. drawer test, stable to varus stress test, stable to valgus stress test. negative Lachman's test, negative pivot shift test Meniscal Test: negative McMurrays, negative Staci. Patellofemoral Alignment Test: Q angle-, normal. Muscle Test: good quad strength. [de-identified] : Right knee x-rays, standing AP/Lateral and Merchant films, and 45-degree PA standing view, taken at the office today shows diffuse tricompartmental degenerative arthritis, mild valgus deformity, lateral joint space narrowing, marginal osteophytes, bone on bone sclerosis especially on the Tilley view, patellofemoral joint space narrowing, peripheral osteophytes, Kellgren and Hugh grade 4.  Left knee x-ray merchant view taken at the office today demonstrates joint space narrowing, marginal osteophytes, sclerosis consistent with patellofemoral arthritis.

## 2023-08-15 RX ORDER — FOLIC ACID 0.8 MG
1 TABLET ORAL DAILY
Refills: 0 | Status: DISCONTINUED | OUTPATIENT
Start: 2023-08-22 | End: 2023-08-23

## 2023-08-15 RX ORDER — ACETAMINOPHEN 500 MG
1000 TABLET ORAL EVERY 8 HOURS
Refills: 0 | Status: DISCONTINUED | OUTPATIENT
Start: 2023-08-22 | End: 2023-08-23

## 2023-08-15 RX ORDER — POLYETHYLENE GLYCOL 3350 17 G/17G
17 POWDER, FOR SOLUTION ORAL AT BEDTIME
Refills: 0 | Status: DISCONTINUED | OUTPATIENT
Start: 2023-08-22 | End: 2023-08-23

## 2023-08-15 RX ORDER — MAGNESIUM HYDROXIDE 400 MG/1
30 TABLET, CHEWABLE ORAL DAILY
Refills: 0 | Status: DISCONTINUED | OUTPATIENT
Start: 2023-08-22 | End: 2023-08-23

## 2023-08-15 RX ORDER — APIXABAN 2.5 MG/1
2.5 TABLET, FILM COATED ORAL EVERY 12 HOURS
Refills: 0 | Status: DISCONTINUED | OUTPATIENT
Start: 2023-08-23 | End: 2023-08-23

## 2023-08-15 RX ORDER — OXYBUTYNIN CHLORIDE 5 MG
10 TABLET ORAL
Refills: 0 | Status: DISCONTINUED | OUTPATIENT
Start: 2023-08-22 | End: 2023-08-23

## 2023-08-15 RX ORDER — ONDANSETRON 8 MG/1
4 TABLET, FILM COATED ORAL EVERY 6 HOURS
Refills: 0 | Status: DISCONTINUED | OUTPATIENT
Start: 2023-08-22 | End: 2023-08-23

## 2023-08-15 RX ORDER — CELECOXIB 200 MG/1
200 CAPSULE ORAL EVERY 12 HOURS
Refills: 0 | Status: DISCONTINUED | OUTPATIENT
Start: 2023-08-22 | End: 2023-08-23

## 2023-08-15 RX ORDER — SODIUM CHLORIDE 9 MG/ML
1000 INJECTION, SOLUTION INTRAVENOUS
Refills: 0 | Status: DISCONTINUED | OUTPATIENT
Start: 2023-08-22 | End: 2023-08-23

## 2023-08-15 RX ORDER — PANTOPRAZOLE SODIUM 20 MG/1
40 TABLET, DELAYED RELEASE ORAL
Refills: 0 | Status: DISCONTINUED | OUTPATIENT
Start: 2023-08-22 | End: 2023-08-23

## 2023-08-15 RX ORDER — SENNA PLUS 8.6 MG/1
2 TABLET ORAL AT BEDTIME
Refills: 0 | Status: DISCONTINUED | OUTPATIENT
Start: 2023-08-22 | End: 2023-08-23

## 2023-08-17 ENCOUNTER — NON-APPOINTMENT (OUTPATIENT)
Age: 83
End: 2023-08-17

## 2023-08-17 RX ORDER — VALACYCLOVIR 500 MG/1
500 TABLET, FILM COATED ORAL DAILY
Refills: 0 | Status: DISCONTINUED | OUTPATIENT
Start: 2023-08-22 | End: 2023-08-23

## 2023-08-21 RX ORDER — CEFAZOLIN SODIUM 1 G
2000 VIAL (EA) INJECTION EVERY 8 HOURS
Refills: 0 | Status: COMPLETED | OUTPATIENT
Start: 2023-08-22 | End: 2023-08-23

## 2023-08-22 ENCOUNTER — INPATIENT (INPATIENT)
Facility: HOSPITAL | Age: 83
LOS: 0 days | Discharge: HOME HEALTH | End: 2023-08-23
Attending: ORTHOPAEDIC SURGERY | Admitting: ORTHOPAEDIC SURGERY
Payer: COMMERCIAL

## 2023-08-22 ENCOUNTER — NON-APPOINTMENT (OUTPATIENT)
Age: 83
End: 2023-08-22

## 2023-08-22 ENCOUNTER — APPOINTMENT (OUTPATIENT)
Dept: ORTHOPEDIC SURGERY | Facility: HOSPITAL | Age: 83
End: 2023-08-22

## 2023-08-22 ENCOUNTER — TRANSCRIPTION ENCOUNTER (OUTPATIENT)
Age: 83
End: 2023-08-22

## 2023-08-22 VITALS
RESPIRATION RATE: 15 BRPM | TEMPERATURE: 98 F | HEART RATE: 74 BPM | SYSTOLIC BLOOD PRESSURE: 165 MMHG | WEIGHT: 207.9 LBS | OXYGEN SATURATION: 97 % | HEIGHT: 66 IN | DIASTOLIC BLOOD PRESSURE: 82 MMHG

## 2023-08-22 DIAGNOSIS — Z98.891 HISTORY OF UTERINE SCAR FROM PREVIOUS SURGERY: Chronic | ICD-10-CM

## 2023-08-22 DIAGNOSIS — Z96.651 PRESENCE OF RIGHT ARTIFICIAL KNEE JOINT: ICD-10-CM

## 2023-08-22 DIAGNOSIS — Z98.890 OTHER SPECIFIED POSTPROCEDURAL STATES: Chronic | ICD-10-CM

## 2023-08-22 DIAGNOSIS — Z90.49 ACQUIRED ABSENCE OF OTHER SPECIFIED PARTS OF DIGESTIVE TRACT: Chronic | ICD-10-CM

## 2023-08-22 LAB
ANION GAP SERPL CALC-SCNC: 4 MMOL/L — LOW (ref 5–17)
BUN SERPL-MCNC: 24 MG/DL — HIGH (ref 7–23)
CALCIUM SERPL-MCNC: 9.1 MG/DL — SIGNIFICANT CHANGE UP (ref 8.5–10.1)
CHLORIDE SERPL-SCNC: 111 MMOL/L — HIGH (ref 96–108)
CO2 SERPL-SCNC: 29 MMOL/L — SIGNIFICANT CHANGE UP (ref 22–31)
CREAT SERPL-MCNC: 1.28 MG/DL — SIGNIFICANT CHANGE UP (ref 0.5–1.3)
EGFR: 41 ML/MIN/1.73M2 — LOW
GLUCOSE BLDC GLUCOMTR-MCNC: 85 MG/DL — SIGNIFICANT CHANGE UP (ref 70–99)
GLUCOSE SERPL-MCNC: 141 MG/DL — HIGH (ref 70–99)
HCT VFR BLD CALC: 37.3 % — SIGNIFICANT CHANGE UP (ref 34.5–45)
HGB BLD-MCNC: 12.2 G/DL — SIGNIFICANT CHANGE UP (ref 11.5–15.5)
MCHC RBC-ENTMCNC: 30.1 PG — SIGNIFICANT CHANGE UP (ref 27–34)
MCHC RBC-ENTMCNC: 32.7 G/DL — SIGNIFICANT CHANGE UP (ref 32–36)
MCV RBC AUTO: 92.1 FL — SIGNIFICANT CHANGE UP (ref 80–100)
NRBC # BLD: 0 /100 WBCS — SIGNIFICANT CHANGE UP (ref 0–0)
PLATELET # BLD AUTO: 178 K/UL — SIGNIFICANT CHANGE UP (ref 150–400)
POTASSIUM SERPL-MCNC: 4.1 MMOL/L — SIGNIFICANT CHANGE UP (ref 3.5–5.3)
POTASSIUM SERPL-SCNC: 4.1 MMOL/L — SIGNIFICANT CHANGE UP (ref 3.5–5.3)
RBC # BLD: 4.05 M/UL — SIGNIFICANT CHANGE UP (ref 3.8–5.2)
RBC # FLD: 13.5 % — SIGNIFICANT CHANGE UP (ref 10.3–14.5)
SODIUM SERPL-SCNC: 144 MMOL/L — SIGNIFICANT CHANGE UP (ref 135–145)
WBC # BLD: 11.66 K/UL — HIGH (ref 3.8–10.5)
WBC # FLD AUTO: 11.66 K/UL — HIGH (ref 3.8–10.5)

## 2023-08-22 PROCEDURE — 27447 TOTAL KNEE ARTHROPLASTY: CPT | Mod: RT

## 2023-08-22 PROCEDURE — 73560 X-RAY EXAM OF KNEE 1 OR 2: CPT | Mod: 26,RT

## 2023-08-22 DEVICE — ZIMMER/NEXGEN HEX HEAD SCREW 3.5MM: Type: IMPLANTABLE DEVICE | Site: RIGHT | Status: FUNCTIONAL

## 2023-08-22 DEVICE — STEM EXT PERSONA 14MM PLUS 30M: Type: IMPLANTABLE DEVICE | Site: RIGHT | Status: FUNCTIONAL

## 2023-08-22 DEVICE — CEMENT PALACOS R: Type: IMPLANTABLE DEVICE | Site: RIGHT | Status: FUNCTIONAL

## 2023-08-22 DEVICE — ZIMMER FEMALE HEX SCREW MAGNETIC 2.5MM X 25MM: Type: IMPLANTABLE DEVICE | Site: RIGHT | Status: FUNCTIONAL

## 2023-08-22 DEVICE — SURF ART PERSONA RT 6-9 EF 10MM: Type: IMPLANTABLE DEVICE | Site: RIGHT | Status: FUNCTIONAL

## 2023-08-22 DEVICE — ZIMMER/NEXGEN SMOOTH PIN 3.2X75MM: Type: IMPLANTABLE DEVICE | Site: RIGHT | Status: FUNCTIONAL

## 2023-08-22 DEVICE — PATELLA  ALL POLY VE 32MM: Type: IMPLANTABLE DEVICE | Site: RIGHT | Status: FUNCTIONAL

## 2023-08-22 DEVICE — STEM TIB PERSONA SZ E R 5 DEG: Type: IMPLANTABLE DEVICE | Site: RIGHT | Status: FUNCTIONAL

## 2023-08-22 DEVICE — FEM PERSONA PS CMNT CCR NRW RT SZ9: Type: IMPLANTABLE DEVICE | Site: RIGHT | Status: FUNCTIONAL

## 2023-08-22 RX ORDER — OXYCODONE HYDROCHLORIDE 5 MG/1
5 TABLET ORAL EVERY 4 HOURS
Refills: 0 | Status: DISCONTINUED | OUTPATIENT
Start: 2023-08-22 | End: 2023-08-22

## 2023-08-22 RX ORDER — DORZOLAMIDE HYDROCHLORIDE 20 MG/ML
1 SOLUTION/ DROPS OPHTHALMIC
Refills: 0 | Status: DISCONTINUED | OUTPATIENT
Start: 2023-08-22 | End: 2023-08-23

## 2023-08-22 RX ORDER — FENTANYL CITRATE 50 UG/ML
50 INJECTION INTRAVENOUS
Refills: 0 | Status: DISCONTINUED | OUTPATIENT
Start: 2023-08-22 | End: 2023-08-22

## 2023-08-22 RX ORDER — OXYCODONE HYDROCHLORIDE 5 MG/1
10 TABLET ORAL EVERY 4 HOURS
Refills: 0 | Status: DISCONTINUED | OUTPATIENT
Start: 2023-08-22 | End: 2023-08-22

## 2023-08-22 RX ORDER — SODIUM CHLORIDE 9 MG/ML
3 INJECTION INTRAMUSCULAR; INTRAVENOUS; SUBCUTANEOUS EVERY 8 HOURS
Refills: 0 | Status: DISCONTINUED | OUTPATIENT
Start: 2023-08-22 | End: 2023-08-22

## 2023-08-22 RX ORDER — ACETAMINOPHEN 500 MG
1000 TABLET ORAL ONCE
Refills: 0 | Status: COMPLETED | OUTPATIENT
Start: 2023-08-22 | End: 2023-08-22

## 2023-08-22 RX ORDER — SEMAGLUTIDE 0.68 MG/ML
0.5 INJECTION, SOLUTION SUBCUTANEOUS
Refills: 0 | DISCHARGE

## 2023-08-22 RX ORDER — OXYCODONE HYDROCHLORIDE 5 MG/1
5 TABLET ORAL
Refills: 0 | Status: DISCONTINUED | OUTPATIENT
Start: 2023-08-22 | End: 2023-08-23

## 2023-08-22 RX ORDER — VALACYCLOVIR 500 MG/1
1 TABLET, FILM COATED ORAL
Refills: 0 | DISCHARGE

## 2023-08-22 RX ORDER — DORZOLAMIDE HYDROCHLORIDE 20 MG/ML
1 SOLUTION/ DROPS OPHTHALMIC
Refills: 0 | DISCHARGE

## 2023-08-22 RX ORDER — ONDANSETRON 8 MG/1
4 TABLET, FILM COATED ORAL ONCE
Refills: 0 | Status: DISCONTINUED | OUTPATIENT
Start: 2023-08-22 | End: 2023-08-22

## 2023-08-22 RX ORDER — FAMOTIDINE 10 MG/ML
40 INJECTION INTRAVENOUS DAILY
Refills: 0 | Status: DISCONTINUED | OUTPATIENT
Start: 2023-08-22 | End: 2023-08-22

## 2023-08-22 RX ORDER — OXYCODONE HYDROCHLORIDE 5 MG/1
10 TABLET ORAL
Refills: 0 | Status: DISCONTINUED | OUTPATIENT
Start: 2023-08-22 | End: 2023-08-23

## 2023-08-22 RX ORDER — HYDRALAZINE HCL 50 MG
5 TABLET ORAL ONCE
Refills: 0 | Status: DISCONTINUED | OUTPATIENT
Start: 2023-08-22 | End: 2023-08-23

## 2023-08-22 RX ORDER — SODIUM CHLORIDE 9 MG/ML
1000 INJECTION, SOLUTION INTRAVENOUS
Refills: 0 | Status: DISCONTINUED | OUTPATIENT
Start: 2023-08-22 | End: 2023-08-22

## 2023-08-22 RX ORDER — ACETAMINOPHEN 500 MG
650 TABLET ORAL ONCE
Refills: 0 | Status: COMPLETED | OUTPATIENT
Start: 2023-08-22 | End: 2023-08-22

## 2023-08-22 RX ORDER — TOLTERODINE TARTRATE 1 MG/1
1 TABLET, FILM COATED ORAL
Refills: 0 | DISCHARGE

## 2023-08-22 RX ORDER — CELECOXIB 200 MG/1
200 CAPSULE ORAL ONCE
Refills: 0 | Status: COMPLETED | OUTPATIENT
Start: 2023-08-22 | End: 2023-08-22

## 2023-08-22 RX ORDER — FAMOTIDINE 10 MG/ML
1 INJECTION INTRAVENOUS
Refills: 0 | DISCHARGE

## 2023-08-22 RX ADMIN — SODIUM CHLORIDE 75 MILLILITER(S): 9 INJECTION, SOLUTION INTRAVENOUS at 12:41

## 2023-08-22 RX ADMIN — CELECOXIB 200 MILLIGRAM(S): 200 CAPSULE ORAL at 17:38

## 2023-08-22 RX ADMIN — Medication 400 MILLIGRAM(S): at 12:25

## 2023-08-22 RX ADMIN — Medication 1000 MILLIGRAM(S): at 13:24

## 2023-08-22 RX ADMIN — OXYCODONE HYDROCHLORIDE 5 MILLIGRAM(S): 5 TABLET ORAL at 23:15

## 2023-08-22 RX ADMIN — OXYCODONE HYDROCHLORIDE 10 MILLIGRAM(S): 5 TABLET ORAL at 13:24

## 2023-08-22 RX ADMIN — Medication 650 MILLIGRAM(S): at 07:23

## 2023-08-22 RX ADMIN — SODIUM CHLORIDE 3 MILLILITER(S): 9 INJECTION INTRAMUSCULAR; INTRAVENOUS; SUBCUTANEOUS at 07:26

## 2023-08-22 RX ADMIN — Medication 1000 MILLIGRAM(S): at 22:14

## 2023-08-22 RX ADMIN — OXYCODONE HYDROCHLORIDE 10 MILLIGRAM(S): 5 TABLET ORAL at 17:05

## 2023-08-22 RX ADMIN — DORZOLAMIDE HYDROCHLORIDE 1 DROP(S): 20 SOLUTION/ DROPS OPHTHALMIC at 22:14

## 2023-08-22 RX ADMIN — OXYCODONE HYDROCHLORIDE 10 MILLIGRAM(S): 5 TABLET ORAL at 12:40

## 2023-08-22 RX ADMIN — Medication 100 MILLIGRAM(S): at 16:35

## 2023-08-22 RX ADMIN — POLYETHYLENE GLYCOL 3350 17 GRAM(S): 17 POWDER, FOR SOLUTION ORAL at 22:15

## 2023-08-22 RX ADMIN — OXYCODONE HYDROCHLORIDE 5 MILLIGRAM(S): 5 TABLET ORAL at 22:15

## 2023-08-22 RX ADMIN — Medication 1000 MILLIGRAM(S): at 23:14

## 2023-08-22 RX ADMIN — SODIUM CHLORIDE 100 MILLILITER(S): 9 INJECTION, SOLUTION INTRAVENOUS at 11:17

## 2023-08-22 RX ADMIN — SENNA PLUS 2 TABLET(S): 8.6 TABLET ORAL at 22:14

## 2023-08-22 RX ADMIN — OXYCODONE HYDROCHLORIDE 10 MILLIGRAM(S): 5 TABLET ORAL at 16:05

## 2023-08-22 RX ADMIN — CELECOXIB 200 MILLIGRAM(S): 200 CAPSULE ORAL at 18:33

## 2023-08-22 RX ADMIN — CELECOXIB 200 MILLIGRAM(S): 200 CAPSULE ORAL at 07:23

## 2023-08-22 NOTE — PHYSICAL THERAPY INITIAL EVALUATION ADULT - ADDITIONAL COMMENTS
As per pre-op and reviewed with patient POD #0: Patient lives with spouse in [facility] in a private house  with a threshold to enter without handrails, but wide enough to fit a rolling walker. All living amenities are located on one level. The bathroom has a tub/shower combination, 1 grab in the shower, fixed shower head and standard  toilet. Pt's toilet has adequate space to fit a commode over it. Pt endorsed that her spouse will be able to assist her after she discharged home post-operatively.

## 2023-08-22 NOTE — DISCHARGE NOTE PROVIDER - HOSPITAL COURSE
Hospital Course:  The patient is a 83y Female status post elective Total Knee Arthroplasty to the right knee after failing outpatient nonoperative conservative management. Patient presented to [facility] after being medically cleared for an elective surgical procedure. The patient was taken to the operating room on date mentioned above. Prophylactic antibiotics were started before the procedure and continued for 24 hours. There were no complications during the procedure and patient tolerated the procedure well. The patient was transferred to the recovery room in stable condition and subsequently to the surgical floor. The patient was placed on eliquis for anticoagulation while in house to continue until POD 14, then starting POD 15 patient instructed to start ASA 81mg PO BID for a total of 30 days. All home medications were continued. The patient received physical therapy daily and daily labs were followed. The hemovac drain was DC'd on POD #1. The dressing was kept clean, dry, intact. **************The rest of the hospital stay was unremarkable. Hospital Course:  The patient is a 83y Female status post elective Total Knee Arthroplasty to the right knee after failing outpatient nonoperative conservative management. Patient presented to [facility] after being medically cleared for an elective surgical procedure. The patient was taken to the operating room on date mentioned above. Prophylactic antibiotics were started before the procedure and continued for 24 hours. There were no complications during the procedure and patient tolerated the procedure well. The patient was transferred to the recovery room in stable condition and subsequently to the surgical floor. The patient was placed on eliquis for anticoagulation while in house to continue until POD 14, then starting POD 15 patient instructed to start ASA 81mg PO BID for a total of 30 days. All home medications were continued. The patient received physical therapy daily and daily labs were followed. The hemovac drain was DC'd on POD #1. The dressing was kept clean, dry, intact. The rest of the hospital stay was unremarkable.

## 2023-08-22 NOTE — PROGRESS NOTE ADULT - SUBJECTIVE AND OBJECTIVE BOX
Postop Check    Patient tolerated the procedure well. Patient seen and examined at bedside.  No acute complaints at this time. Pain well controlled. Denies chest pain, shortness of breath, nausea or vomiting.     PE:  T(C): 36.4 (08-22-23 @ 12:12), Max: 36.5 (08-22-23 @ 06:57)  HR: 87 (08-22-23 @ 12:12) (73 - 87)  BP: 172/80 (08-22-23 @ 12:12) (120/45 - 172/80)  RR: 16 (08-22-23 @ 12:12) (12 - 18)  SpO2: 99% (08-22-23 @ 12:12) (94% - 99%)    General: NAD, resting comfortably in bed  RLE:   Dressing C/D/I  HMV in place, SS drainage  SCDs present bilaterally  Compartments soft and compressible  No calf tenderness bilaterally  +TA/EHL/FHL/GSC  SILT peewee/saph/sp/dp/tib  palpable DP    LABS:                        12.2   11.66 )-----------( 178      ( 22 Aug 2023 11:05 )             37.3     08-22    144  |  111<H>  |  24<H>  ----------------------------<  141<H>  4.1   |  29  |  1.28    Ca    9.1      22 Aug 2023 11:05          A/P:  83y F s/p R TKA POD 0  -PT/OT   -WBAT RLE  -please document HMV output  -Pain Control  -DVT ppx eliquis 2.5mg bid for 14 days followed by A81 bid for 30 days  -Continue perioperative abx x 24 hours  -FU AM Labs  -Rest, ice, compress and elevate the extremity as needed  -Incentive Spirometry  -Medical management appreciated  -dispo pending PT eval

## 2023-08-22 NOTE — DISCHARGE NOTE PROVIDER - NSDCFUADDINST_GEN_ALL_CORE_FT
1.	Pain Control  2.	Walking with full weight bearing as tolerated, with assistive devices (walker/Cane as Needed)  3.	DVT Prophylaxis, eliquis 2.5mg twice daily for 14 days. Day 15 start Aspirin 81 mg twice a day for 30 days. do not skip doses.  4.	PT as needed  5.	Please call the office and make an appointment for staple removal in 21 days. [phone]  6.	Remove Dressing Post-Op Day 10-14, with Dry dressing and Daily Dressing Changes as Need.  7.	Ice/Elevate affected area as Needed  8.	Keep Dressing Clean and dry. 1.	Pain Control  2.	Walking with full weight bearing as tolerated, with assistive devices (walker/Cane as Needed)  3.	DVT Prophylaxis, eliquis 2.5mg twice daily for 14 days. Day 15 start Aspirin 81 mg twice a day for 30 days. do not skip doses.  4.	PT as needed  5.	Please call the office and make an appointment for staple removal in 21 days. [phone]  6.	Remove Dressing Post-Op Day 10-14, with Dry dressing and Daily Dressing Changes as Need.  7.	Ice/Elevate affected area as Needed  8.	Keep Dressing Clean and dry.  9. 	Eat a DASH, low sodium diet, begin exercising as tolerated, and follow up with your PCP or Dr. Branham to monitor your blood pressure

## 2023-08-22 NOTE — DISCHARGE NOTE PROVIDER - CARE PROVIDERS DIRECT ADDRESSES
[email or web address]

## 2023-08-22 NOTE — DISCHARGE NOTE PROVIDER - NSDCFUSCHEDAPPT_GEN_ALL_CORE_FT
Josue Mariscal  [facility]  ORTHOSURG [address]  Scheduled Appointment: 09/13/2023

## 2023-08-22 NOTE — DISCHARGE NOTE PROVIDER - NSDCMRMEDTOKEN_GEN_ALL_CORE_FT
Detrol LA 4 mg oral capsule, extended release: 1 orally once a day  Ozempic 2 mg/1.5 mL (0.25 mg or 0.5 mg dose) subcutaneous solution: 0.5 subcutaneously once a week  Pepcid 40 mg oral tablet: 1 orally once a day  Valtrex 500 mg oral tablet: 1 orally once a day   Aspirin Enteric Coated 81 mg oral delayed release tablet: 1 tab(s) orally 2 times a day To begin on postop day 15 after completion of eliquis  Colace 100 mg oral capsule: 1 cap(s) orally 2 times a day as needed for -for constipation  Detrol LA 4 mg oral capsule, extended release: 1 orally once a day  dorzolamide 2% ophthalmic solution: 1 gram(s) in the left eye 2 times a day  Eliquis 2.5 mg oral tablet: 1 tab(s) orally 2 times a day take until postop day 15  ondansetron 4 mg oral tablet: 1 tab(s) orally every 6 hours  oxyCODONE 5 mg oral tablet: 1 tab(s) orally every 6 hours as needed for  severe pain MDD: 4  Ozempic 2 mg/1.5 mL (0.25 mg or 0.5 mg dose) subcutaneous solution: 0.5 subcutaneously once a week  Pepcid 40 mg oral tablet: 1 orally once a day  Tylenol Extra Strength 500 mg oral tablet: 2 tab(s) orally every 8 hours  Valtrex 500 mg oral tablet: 1 orally once a day

## 2023-08-22 NOTE — DISCHARGE NOTE PROVIDER - CARE PROVIDER_API CALL
Josue Mariscal.  Orthopaedic Surgery  [address] [place] [address], [address]  [place] [address]  Phone: [phone]  Fax: [phone]  Follow Up Time:

## 2023-08-22 NOTE — PHYSICAL THERAPY INITIAL EVALUATION ADULT - GAIT TRAINING, PT EVAL
Patient will ambulate 500 feet with rolling walker independently for community ambulation in 2-3 days. Patient will ascend/descend 1 step with B rails independently in 2-3 days to safely navigate home environment.

## 2023-08-22 NOTE — OCCUPATIONAL THERAPY INITIAL EVALUATION ADULT - ADDITIONAL COMMENTS
Pre op assessment - pt lives with spouse in [facility] in a private house  with a threshold to enter without handrails, but wide enough to fit a rolling walker. All living amenities are located on one level. The bathroom has a tub/shower combination, 1 grab in the shower, fixed shower head and standard  toilet. Pt's toilet has adequate space to fit a commode over it. Pt endorsed that her spouse will be able to assist her after she discharged home post-operatively.

## 2023-08-23 ENCOUNTER — TRANSCRIPTION ENCOUNTER (OUTPATIENT)
Age: 83
End: 2023-08-23

## 2023-08-23 VITALS
SYSTOLIC BLOOD PRESSURE: 164 MMHG | TEMPERATURE: 98 F | RESPIRATION RATE: 16 BRPM | OXYGEN SATURATION: 96 % | DIASTOLIC BLOOD PRESSURE: 77 MMHG | HEART RATE: 63 BPM

## 2023-08-23 LAB
ANION GAP SERPL CALC-SCNC: 4 MMOL/L — LOW (ref 5–17)
BUN SERPL-MCNC: 21 MG/DL — SIGNIFICANT CHANGE UP (ref 7–23)
CALCIUM SERPL-MCNC: 8.8 MG/DL — SIGNIFICANT CHANGE UP (ref 8.5–10.1)
CHLORIDE SERPL-SCNC: 104 MMOL/L — SIGNIFICANT CHANGE UP (ref 96–108)
CO2 SERPL-SCNC: 28 MMOL/L — SIGNIFICANT CHANGE UP (ref 22–31)
CREAT SERPL-MCNC: 1.02 MG/DL — SIGNIFICANT CHANGE UP (ref 0.5–1.3)
EGFR: 54 ML/MIN/1.73M2 — LOW
GLUCOSE SERPL-MCNC: 129 MG/DL — HIGH (ref 70–99)
HCT VFR BLD CALC: 31.8 % — LOW (ref 34.5–45)
HGB BLD-MCNC: 11.1 G/DL — LOW (ref 11.5–15.5)
MCHC RBC-ENTMCNC: 30.7 PG — SIGNIFICANT CHANGE UP (ref 27–34)
MCHC RBC-ENTMCNC: 34.9 G/DL — SIGNIFICANT CHANGE UP (ref 32–36)
MCV RBC AUTO: 88.1 FL — SIGNIFICANT CHANGE UP (ref 80–100)
NRBC # BLD: 0 /100 WBCS — SIGNIFICANT CHANGE UP (ref 0–0)
PLATELET # BLD AUTO: 204 K/UL — SIGNIFICANT CHANGE UP (ref 150–400)
POTASSIUM SERPL-MCNC: 3.9 MMOL/L — SIGNIFICANT CHANGE UP (ref 3.5–5.3)
POTASSIUM SERPL-SCNC: 3.9 MMOL/L — SIGNIFICANT CHANGE UP (ref 3.5–5.3)
RBC # BLD: 3.61 M/UL — LOW (ref 3.8–5.2)
RBC # FLD: 13.2 % — SIGNIFICANT CHANGE UP (ref 10.3–14.5)
SODIUM SERPL-SCNC: 136 MMOL/L — SIGNIFICANT CHANGE UP (ref 135–145)
WBC # BLD: 15.75 K/UL — HIGH (ref 3.8–10.5)
WBC # FLD AUTO: 15.75 K/UL — HIGH (ref 3.8–10.5)

## 2023-08-23 RX ORDER — OXYCODONE HYDROCHLORIDE 5 MG/1
1 TABLET ORAL
Qty: 20 | Refills: 0
Start: 2023-08-23 | End: 2023-08-27

## 2023-08-23 RX ORDER — APIXABAN 2.5 MG/1
1 TABLET, FILM COATED ORAL
Qty: 30 | Refills: 0
Start: 2023-08-23 | End: 2023-09-06

## 2023-08-23 RX ORDER — ACETAMINOPHEN 500 MG
2 TABLET ORAL
Qty: 30 | Refills: 0
Start: 2023-08-23 | End: 2023-08-27

## 2023-08-23 RX ORDER — DOCUSATE SODIUM 100 MG
1 CAPSULE ORAL
Qty: 14 | Refills: 0
Start: 2023-08-23 | End: 2023-08-29

## 2023-08-23 RX ORDER — ONDANSETRON 8 MG/1
1 TABLET, FILM COATED ORAL
Qty: 12 | Refills: 0
Start: 2023-08-23

## 2023-08-23 RX ORDER — ASPIRIN/CALCIUM CARB/MAGNESIUM 324 MG
1 TABLET ORAL
Qty: 60 | Refills: 0
Start: 2023-08-23 | End: 2023-09-21

## 2023-08-23 RX ADMIN — OXYCODONE HYDROCHLORIDE 5 MILLIGRAM(S): 5 TABLET ORAL at 05:44

## 2023-08-23 RX ADMIN — VALACYCLOVIR 500 MILLIGRAM(S): 500 TABLET, FILM COATED ORAL at 06:02

## 2023-08-23 RX ADMIN — Medication 1000 MILLIGRAM(S): at 06:53

## 2023-08-23 RX ADMIN — Medication 1000 MILLIGRAM(S): at 14:30

## 2023-08-23 RX ADMIN — APIXABAN 2.5 MILLIGRAM(S): 2.5 TABLET, FILM COATED ORAL at 08:04

## 2023-08-23 RX ADMIN — SODIUM CHLORIDE 75 MILLILITER(S): 9 INJECTION, SOLUTION INTRAVENOUS at 05:53

## 2023-08-23 RX ADMIN — PANTOPRAZOLE SODIUM 40 MILLIGRAM(S): 20 TABLET, DELAYED RELEASE ORAL at 05:54

## 2023-08-23 RX ADMIN — DORZOLAMIDE HYDROCHLORIDE 1 DROP(S): 20 SOLUTION/ DROPS OPHTHALMIC at 09:11

## 2023-08-23 RX ADMIN — Medication 1 TABLET(S): at 11:49

## 2023-08-23 RX ADMIN — Medication 1000 MILLIGRAM(S): at 05:53

## 2023-08-23 RX ADMIN — Medication 10 MILLIGRAM(S): at 05:54

## 2023-08-23 RX ADMIN — OXYCODONE HYDROCHLORIDE 5 MILLIGRAM(S): 5 TABLET ORAL at 12:49

## 2023-08-23 RX ADMIN — Medication 1 MILLIGRAM(S): at 11:49

## 2023-08-23 RX ADMIN — Medication 100 MILLIGRAM(S): at 00:27

## 2023-08-23 RX ADMIN — OXYCODONE HYDROCHLORIDE 5 MILLIGRAM(S): 5 TABLET ORAL at 11:49

## 2023-08-23 RX ADMIN — OXYCODONE HYDROCHLORIDE 5 MILLIGRAM(S): 5 TABLET ORAL at 04:44

## 2023-08-23 RX ADMIN — Medication 1000 MILLIGRAM(S): at 13:30

## 2023-08-23 RX ADMIN — CELECOXIB 200 MILLIGRAM(S): 200 CAPSULE ORAL at 06:43

## 2023-08-23 RX ADMIN — CELECOXIB 200 MILLIGRAM(S): 200 CAPSULE ORAL at 05:54

## 2023-08-23 NOTE — PHARMACOTHERAPY INTERVENTION NOTE - COMMENTS
Modified penicillin allergy to state that pt tolerated cefazolin during this admission with no documented reaction.

## 2023-08-23 NOTE — PROGRESS NOTE ADULT - SUBJECTIVE AND OBJECTIVE BOX
Patient tolerated the procedure well. Patient seen and examined at bedside. No events overnight  No acute complaints at this time. Pain well controlled. Denies chest pain, shortness of breath, nausea or vomiting.     Vital Signs (24 Hrs):  T(C): 36.5 (08-23-23 @ 04:00), Max: 36.6 (08-22-23 @ 19:10)  HR: 73 (08-23-23 @ 04:00) (65 - 87)  BP: 164/80 (08-23-23 @ 04:49) (120/45 - 184/82)  RR: 18 (08-23-23 @ 04:00) (12 - 18)  SpO2: 97% (08-23-23 @ 04:00) (94% - 99%)  Wt(kg): --    LABS:                          12.2   11.66 )-----------( 178      ( 22 Aug 2023 11:05 )             37.3     08-22    144  |  111<H>  |  24<H>  ----------------------------<  141<H>  4.1   |  29  |  1.28    Ca    9.1      22 Aug 2023 11:05          General: NAD, resting comfortably in bed  RLE:   Dressing C/D/I  HMV D/c'd  SCDs present bilaterally  Compartments soft and compressible  No calf tenderness bilaterally  +TA/EHL/FHL/GSC  SILT peewee/saph/sp/dp/tib  palpable DP      LABS:                        12.2   11.66 )-----------( 178      ( 22 Aug 2023 11:05 )             37.3     08-22    144  |  111<H>  |  24<H>  ----------------------------<  141<H>  4.1   |  29  |  1.28    Ca    9.1      22 Aug 2023 11:05          A/P:  83y F s/p R TKA POD 1  -PT/OT   -WBAT RLE  -HMV output-75/160  -Pain Control  -DVT ppx eliquis 2.5mg bid for 14 days followed by A81 bid for 30 days  -Continue perioperative abx x 24 hours  -FU AM Labs  -Rest, ice, compress and elevate the extremity as needed  -Incentive Spirometry  -Medical management appreciated  -dispo pending PT eval; ? Home

## 2023-08-23 NOTE — CHART NOTE - NSCHARTNOTEFT_GEN_A_CORE
Discussed pt and elevated SBP via phone with Dr. Polanco. Recommendations are to follow a DASH diet and begin exercising with follow up with PCP or Dr. Branham in office next week.

## 2023-08-23 NOTE — DISCHARGE NOTE NURSING/CASE MANAGEMENT/SOCIAL WORK - PATIENT PORTAL LINK FT
You can access the FollowMyHealth Patient Portal offered by [facility] by registering at the following website: [email or web address]. By joining [facility]’s FollowMyHealth portal, you will also be able to view your health information using other applications (apps) compatible with our system.

## 2023-08-23 NOTE — DISCHARGE NOTE NURSING/CASE MANAGEMENT/SOCIAL WORK - NSDCPEFALRISK_GEN_ALL_CORE
For information on Fall & Injury Prevention, visit: [email or web address] OR  [email or web address] OR  https://www.cdc.gov/steadi/patient.html

## 2023-08-24 ENCOUNTER — TRANSCRIPTION ENCOUNTER (OUTPATIENT)
Age: 83
End: 2023-08-24

## 2023-08-25 ENCOUNTER — TRANSCRIPTION ENCOUNTER (OUTPATIENT)
Age: 83
End: 2023-08-25

## 2023-08-28 ENCOUNTER — TRANSCRIPTION ENCOUNTER (OUTPATIENT)
Age: 83
End: 2023-08-28

## 2023-08-30 DIAGNOSIS — M17.11 UNILATERAL PRIMARY OSTEOARTHRITIS, RIGHT KNEE: ICD-10-CM

## 2023-08-30 DIAGNOSIS — K21.9 GASTRO-ESOPHAGEAL REFLUX DISEASE WITHOUT ESOPHAGITIS: ICD-10-CM

## 2023-08-30 DIAGNOSIS — Z87.891 PERSONAL HISTORY OF NICOTINE DEPENDENCE: ICD-10-CM

## 2023-08-30 DIAGNOSIS — Z88.0 ALLERGY STATUS TO PENICILLIN: ICD-10-CM

## 2023-08-30 DIAGNOSIS — Z90.49 ACQUIRED ABSENCE OF OTHER SPECIFIED PARTS OF DIGESTIVE TRACT: ICD-10-CM

## 2023-08-30 DIAGNOSIS — Z88.6 ALLERGY STATUS TO ANALGESIC AGENT: ICD-10-CM

## 2023-09-01 ENCOUNTER — NON-APPOINTMENT (OUTPATIENT)
Age: 83
End: 2023-09-01

## 2023-09-08 ENCOUNTER — TRANSCRIPTION ENCOUNTER (OUTPATIENT)
Age: 83
End: 2023-09-08

## 2023-09-13 ENCOUNTER — APPOINTMENT (OUTPATIENT)
Dept: ORTHOPEDIC SURGERY | Facility: CLINIC | Age: 83
End: 2023-09-13
Payer: MEDICARE

## 2023-09-13 DIAGNOSIS — Z96.651 PRESENCE OF RIGHT ARTIFICIAL KNEE JOINT: ICD-10-CM

## 2023-09-13 PROCEDURE — 99024 POSTOP FOLLOW-UP VISIT: CPT

## 2023-09-22 ENCOUNTER — TRANSCRIPTION ENCOUNTER (OUTPATIENT)
Age: 83
End: 2023-09-22

## 2023-09-25 RX ORDER — OXYCODONE 5 MG/1
5 TABLET ORAL
Qty: 40 | Refills: 0 | Status: ACTIVE | COMMUNITY
Start: 2023-08-29 | End: 1900-01-01

## 2023-11-17 RX ORDER — CLINDAMYCIN HYDROCHLORIDE 300 MG/1
300 CAPSULE ORAL
Qty: 10 | Refills: 2 | Status: ACTIVE | COMMUNITY
Start: 2023-11-17 | End: 1900-01-01

## 2024-04-19 ENCOUNTER — NON-APPOINTMENT (OUTPATIENT)
Age: 84
End: 2024-04-19

## 2024-08-16 ENCOUNTER — NON-APPOINTMENT (OUTPATIENT)
Age: 84
End: 2024-08-16

## 2025-06-23 ENCOUNTER — NON-APPOINTMENT (OUTPATIENT)
Age: 85
End: 2025-06-23

## 2025-07-15 ENCOUNTER — OFFICE (OUTPATIENT)
Dept: URBAN - METROPOLITAN AREA CLINIC 29 | Facility: CLINIC | Age: 85
Setting detail: OPHTHALMOLOGY
End: 2025-07-15
Payer: MEDICARE

## 2025-07-15 DIAGNOSIS — H35.3121: ICD-10-CM

## 2025-07-15 DIAGNOSIS — H43.813: ICD-10-CM

## 2025-07-15 DIAGNOSIS — H40.002: ICD-10-CM

## 2025-07-15 DIAGNOSIS — H40.003: ICD-10-CM

## 2025-07-15 DIAGNOSIS — H26.493: ICD-10-CM

## 2025-07-15 DIAGNOSIS — H35.3211: ICD-10-CM

## 2025-07-15 DIAGNOSIS — H40.001: ICD-10-CM

## 2025-07-15 PROCEDURE — 92083 EXTENDED VISUAL FIELD XM: CPT | Performed by: OPHTHALMOLOGY

## 2025-07-15 PROCEDURE — 92014 COMPRE OPH EXAM EST PT 1/>: CPT | Performed by: OPHTHALMOLOGY

## 2025-07-15 PROCEDURE — 92133 CPTRZD OPH DX IMG PST SGM ON: CPT | Performed by: OPHTHALMOLOGY

## 2025-07-15 PROCEDURE — 92020 GONIOSCOPY: CPT | Performed by: OPHTHALMOLOGY

## 2025-07-15 ASSESSMENT — REFRACTION_AUTOREFRACTION
OS_CYLINDER: +0.75
OD_AXIS: 125
OD_CYLINDER: +0.25
OS_AXIS: 165
OD_SPHERE: -0.25
OS_SPHERE: -0.75

## 2025-07-15 ASSESSMENT — REFRACTION_CURRENTRX
OD_OVR_VA: 20/
OS_OVR_VA: 20/
OS_SPHERE: +1.75
OD_SPHERE: +1.75

## 2025-07-15 ASSESSMENT — VISUAL ACUITY
OD_BCVA: 20/30-2
OS_BCVA: 20/20-2

## 2025-07-15 ASSESSMENT — PACHYMETRY
OD_CT_CORRECTION: -1
OD_CT_UM: 566

## 2025-07-15 ASSESSMENT — CONFRONTATIONAL VISUAL FIELD TEST (CVF)
OD_FINDINGS: FULL
OS_FINDINGS: FULL

## 2025-07-15 ASSESSMENT — TONOMETRY: OD_IOP_MMHG: 15

## 2025-08-19 ENCOUNTER — NON-APPOINTMENT (OUTPATIENT)
Age: 85
End: 2025-08-19

## 2025-08-21 ENCOUNTER — NON-APPOINTMENT (OUTPATIENT)
Age: 85
End: 2025-08-21

## 2025-08-21 ENCOUNTER — APPOINTMENT (OUTPATIENT)
Dept: OTOLARYNGOLOGY | Facility: CLINIC | Age: 85
End: 2025-08-21
Payer: MEDICARE

## 2025-08-21 DIAGNOSIS — R05.3 CHRONIC COUGH: ICD-10-CM

## 2025-08-21 DIAGNOSIS — K21.9 GASTRO-ESOPHAGEAL REFLUX DISEASE W/OUT ESOPHAGITIS: ICD-10-CM

## 2025-08-21 PROCEDURE — 31575 DIAGNOSTIC LARYNGOSCOPY: CPT

## 2025-08-21 PROCEDURE — 99203 OFFICE O/P NEW LOW 30 MIN: CPT | Mod: 25

## (undated) DEVICE — ZIMMER MIXING BOWL WITH SPATULA

## (undated) DEVICE — DRAPE LIMB BILATERAL

## (undated) DEVICE — PACK TOTAL JOINT

## (undated) DEVICE — MEDICATION LABELS W MARKER

## (undated) DEVICE — SUT VICRYL 2-0 27" CT-2 UNDYED

## (undated) DEVICE — DRAPE TOWEL BLUE 17" X 24"

## (undated) DEVICE — DRSG WEBRIL 6"

## (undated) DEVICE — PACK BASIC

## (undated) DEVICE — SAW BLADE STRYKER SAGITTAL DUAL CUT 75X18X1.27MM

## (undated) DEVICE — NDL HYPO SAFE 18G X 1.5" (PINK)

## (undated) DEVICE — CRYO/CUFF GRAVITY COOLER KNEE LARGE

## (undated) DEVICE — DRSG AQUACEL 3.5 X 14"

## (undated) DEVICE — DRSG AQUACEL 3.5 X 10"

## (undated) DEVICE — DRAPE STERI-DRAPE INCISE 32X33"

## (undated) DEVICE — DRAIN JACKSON PRATT 3 SPRING RESERVOIR W 10FR PVC DRAIN

## (undated) DEVICE — DRAPE SHOWER CURTAIN ISOLATION

## (undated) DEVICE — DRAPE EXTREMITY 87" X 128.5"

## (undated) DEVICE — SUCTION YANKAUER NO CONTROL VENT

## (undated) DEVICE — FRA-TOURNIQUET 402010120012: Type: DURABLE MEDICAL EQUIPMENT

## (undated) DEVICE — ZIMMER BLADE PATELLA REAMER W PILOT HOLE SZ 32

## (undated) DEVICE — DRAPE INSTRUMENT POUCH 6.75" X 11"

## (undated) DEVICE — FRA-ESU BOVIE FORCE TRIAD T7J19731DX: Type: DURABLE MEDICAL EQUIPMENT

## (undated) DEVICE — PREP CHLORAPREP HI-LITE ORANGE 26ML

## (undated) DEVICE — SAW SAGITTAL 2108 SERIES 20.5X1.27X85MM

## (undated) DEVICE — SYR LUER LOK 50CC

## (undated) DEVICE — SYR LUER LOK 10CC

## (undated) DEVICE — DRSG AQUACEL 3.5 X 12"

## (undated) DEVICE — MARKING PEN W RULER

## (undated) DEVICE — TOURNIQUET ESMARK 6"

## (undated) DEVICE — GOWN XXL EXTRA LONG

## (undated) DEVICE — DRAPE 3/4 SHEET W REINFORCEMENT 56X77"

## (undated) DEVICE — DRAPE STERI-DRAPE INCISE 19X17"

## (undated) DEVICE — DRSG ACE BANDAGE 6"

## (undated) DEVICE — GLV 9 PROTEXIS ORTHO (BROWN)

## (undated) DEVICE — HOOD FLYTE STRYKER HELMET SHIELD

## (undated) DEVICE — ZIMMER PULSAVAC PLUS FAN KIT

## (undated) DEVICE — BLADE SURGICAL #20 CARBON

## (undated) DEVICE — BLADE SURGICAL #15 CARBON

## (undated) DEVICE — NDL HYPO SAFE 22G X 1.5" (BLACK)

## (undated) DEVICE — SUT VICRYL 0 18" CT-1 UNDYED (POP-OFF)

## (undated) DEVICE — ZIMMER BLADE PATELLA REAMER 35MM

## (undated) DEVICE — GLV 9 PROTEXIS (WHITE)